# Patient Record
Sex: FEMALE | Race: WHITE | Employment: UNEMPLOYED | URBAN - METROPOLITAN AREA
[De-identification: names, ages, dates, MRNs, and addresses within clinical notes are randomized per-mention and may not be internally consistent; named-entity substitution may affect disease eponyms.]

---

## 2024-06-13 ENCOUNTER — OFFICE VISIT (OUTPATIENT)
Dept: ORTHOPEDIC SURGERY | Age: 39
End: 2024-06-13

## 2024-06-13 VITALS — HEIGHT: 62 IN | BODY MASS INDEX: 40.48 KG/M2 | WEIGHT: 220 LBS

## 2024-06-13 DIAGNOSIS — S43.432A TEAR OF LEFT GLENOID LABRUM, INITIAL ENCOUNTER: ICD-10-CM

## 2024-06-13 DIAGNOSIS — S42.142A GLENOID FRACTURE OF SHOULDER, LEFT, CLOSED, INITIAL ENCOUNTER: Primary | ICD-10-CM

## 2024-06-13 DIAGNOSIS — S42.152A GLENOID FRACTURE OF SHOULDER, LEFT, CLOSED, INITIAL ENCOUNTER: Primary | ICD-10-CM

## 2024-06-13 DIAGNOSIS — M25.512 LEFT SHOULDER PAIN, UNSPECIFIED CHRONICITY: Primary | ICD-10-CM

## 2024-06-13 ASSESSMENT — ENCOUNTER SYMPTOMS
ALLERGIC/IMMUNOLOGIC NEGATIVE: 1
EYE DISCHARGE: 0
SHORTNESS OF BREATH: 0
ABDOMINAL DISTENTION: 0

## 2024-06-13 NOTE — PROGRESS NOTES
Bryanna Barbour (:  1985) is a 38 y.o. female,New patient, here for evaluation of the following chief complaint(s):  Shoulder Pain and Injury (Patient states she fell off a horse 1 week ago, injuring left shoulder. Reports difficulty and pain with ROM and lifting. )      Assessment & Plan   ASSESSMENT/PLAN:  1. Glenoid fracture of shoulder, left, closed, initial encounter  -     Derrell Castillo MD, Orthopaedics, [facility]  2. Tear of left glenoid labrum, initial encounter  -     Derrell Castillo MD, Orthopaedics, [facility]    This is a 38 y.o. year old female with Glenoid fracture of shoulder, left, closed, initial encounter [S42.142A, S42.152A] with associated labral tear  I discussed a variety of treatment options with the patient today including observation, NSAID, low impact exercise, weight loss, physical therapy and injections. I also discussed the risks, benefits, alternatives and subsequent rehab with surgery (Dr. Juárez referral). The patient would like to proceed with referral.    Return if symptoms worsen or fail to improve.         Subjective   SUBJECTIVE/OBJECTIVE:  HPI    38-year-old female presents the office today with 1 week history of left shoulder pain.  She states she fell off a horse 1 week ago injuring her left shoulder.  She has continued pain and difficulties with range of motion and lifting.  She had an MRI performed at an outside facility which she brought in disc today.  I will discuss that below.  She rates her pain 6 out of 10.  She denies a johnathon dislocation of her shoulder with formal reduction however, she does believe that it subluxed when she fell.  She is here today to discuss further treatment options.    History reviewed. No pertinent past medical history.  History reviewed. No pertinent surgical history.   History reviewed. No pertinent family history.         Review of Systems   Constitutional:  Positive for activity